# Patient Record
Sex: MALE | Race: BLACK OR AFRICAN AMERICAN | Employment: OTHER | URBAN - METROPOLITAN AREA
[De-identification: names, ages, dates, MRNs, and addresses within clinical notes are randomized per-mention and may not be internally consistent; named-entity substitution may affect disease eponyms.]

---

## 2023-03-07 ENCOUNTER — OFFICE VISIT (OUTPATIENT)
Age: 63
End: 2023-03-07

## 2023-03-07 ENCOUNTER — HOSPITAL ENCOUNTER (OUTPATIENT)
Facility: HOSPITAL | Age: 63
Discharge: HOME | End: 2023-03-10

## 2023-03-07 DIAGNOSIS — M22.2X2 PATELLOFEMORAL DISORDER OF BOTH KNEES: ICD-10-CM

## 2023-03-07 DIAGNOSIS — M22.2X1 PATELLOFEMORAL DISORDER OF BOTH KNEES: ICD-10-CM

## 2023-03-07 DIAGNOSIS — M75.41 IMPINGEMENT SYNDROME OF BOTH SHOULDERS: ICD-10-CM

## 2023-03-07 DIAGNOSIS — M17.0 PRIMARY LOCALIZED OSTEOARTHRITIS OF KNEES, BILATERAL: ICD-10-CM

## 2023-03-07 DIAGNOSIS — M75.42 IMPINGEMENT SYNDROME OF BOTH SHOULDERS: Primary | ICD-10-CM

## 2023-03-07 DIAGNOSIS — M75.41 IMPINGEMENT SYNDROME OF BOTH SHOULDERS: Primary | ICD-10-CM

## 2023-03-07 DIAGNOSIS — M75.42 IMPINGEMENT SYNDROME OF BOTH SHOULDERS: ICD-10-CM

## 2023-03-07 RX ORDER — AMLODIPINE BESYLATE 10 MG/1
TABLET ORAL
COMMUNITY
Start: 2023-02-14

## 2023-03-07 RX ORDER — METHYLPREDNISOLONE ACETATE 40 MG/ML
80 INJECTION, SUSPENSION INTRA-ARTICULAR; INTRALESIONAL; INTRAMUSCULAR; SOFT TISSUE ONCE
Status: COMPLETED | OUTPATIENT
Start: 2023-03-07 | End: 2023-03-07

## 2023-03-07 RX ORDER — PRAVASTATIN SODIUM 20 MG
TABLET ORAL
COMMUNITY
Start: 2023-02-16

## 2023-03-07 RX ORDER — LISINOPRIL AND HYDROCHLOROTHIAZIDE 20; 12.5 MG/1; MG/1
TABLET ORAL
COMMUNITY
Start: 2023-02-14

## 2023-03-07 RX ADMIN — METHYLPREDNISOLONE ACETATE 80 MG: 40 INJECTION, SUSPENSION INTRA-ARTICULAR; INTRALESIONAL; INTRAMUSCULAR; SOFT TISSUE at 16:10

## 2023-03-07 NOTE — PROGRESS NOTES
HISTORY OF PRESENT ILLNESS    David Lewis 1960 is a 58y.o. year old male comes in today as new patient for: pain knees and shoulders    Patients symptoms have been present for 2-3 years. Pain level 9/10 anterior knees and superior shoulders. Knee pain worse stand any length or walk, shoulder worse certain motions. Patient has tried:  ortho eval and cortisone/gel shots knees about 2.5 years ago without any lasting benefit. No Tx shoulders. Never PT and no Rx. Denies any injury. IMAGING: XR shoulders pending    No past surgical history on file. Social History     Socioeconomic History    Marital status: Single      Current Outpatient Medications   Medication Sig Dispense Refill    amLODIPine (NORVASC) 10 MG tablet take 1 tablet by mouth once daily      lisinopril-hydroCHLOROthiazide (PRINZIDE;ZESTORETIC) 20-12.5 MG per tablet take 1 tablet by mouth once daily      pravastatin (PRAVACHOL) 20 MG tablet TAKE 1 TABLET BY MOUTH ONCE A DAY AT BEDTIME FOR 90 DAYS       No current facility-administered medications for this visit. No past medical history on file. No family history on file. ROS:  No numb, tingle. Some swell knees      Objective:  NEURO:  Sensation intact light touch B/L upper/lower extremities. Reflexes +2/4 biceps, triceps, patellar, and Achilles bilaterally. MS:  LE/UE Strength +5/5 bilateral.   bilateral Knee:  1+ Effusion . Lachman negative. Valgus negative. Varus negative. negative joint line tenderness medial.  Magnus negative. Posterior drawer negative. Noble compression test negative. Patellar apprehension negative. Patellar facet positive tender to palpation medial ++ crepitance bilateral.  Pes anserine negative TTP bilateral  SHOULDERS: + brooke and empty can B/L    Assessment/Plan:    Diagnosis Orders   1.  Impingement syndrome of both shoulders  XR SHOULDER LEFT (MIN 2 VIEWS)    XR SHOULDER RIGHT (MIN 2 VIEWS)    diclofenac (VOLTAREN) 50 MG EC tablet    [facility] - [facility]    DRAIN/INJECT LARGE JOINT/BURSA      2. Patellofemoral disorder of both knees  XR SHOULDER LEFT (MIN 2 VIEWS)    XR SHOULDER RIGHT (MIN 2 VIEWS)    diclofenac (VOLTAREN) 50 MG EC tablet    REHABILITATION [facility] - [facility] - [address], [place]      3. Primary localized osteoarthritis of knees, bilateral  XR SHOULDER LEFT (MIN 2 VIEWS)    XR SHOULDER RIGHT (MIN 2 VIEWS)    diclofenac (VOLTAREN) 50 MG EC tablet    [facility] - [facility] - [address]          Patient (or guardian if minor) verbalizes understanding of evaluation and plan. Will start shoulder ROM then RC/PFS HEP, PT, and Rx for voltaren 50mg  as above and plan follow-up 6 weeks. PROCEDURE NOTE:  Time out: 355pm  * Patient was identified by name and date of birth   * Agreement on procedure being performed was verified  * Risks and Benefits explained to the patient  * Procedure site verified and marked as necessary  * Patient was positioned for comfort  * Consent was signed and verified. Risks/benefits including but not limited to bleeding, infection, and scarring discussed and Pt wishes to proceed with procedure. The area was prepped with betadine. Ethyl chloride spray was used, under sterile technique and without ultrasound guidance  1cc of 40mg/cc methylprednisolone acetate and 2cc mepivacaine were injected into left shoulder subacromial bursa. Sterile gauze used to clean the area. Blood loss minimal.  Noticed improvement in pain Sx within 5 minutes (now rated 2/10). The area was prepped with betadine. Ethyl chloride spray was used, under sterile technique and without ultrasound guidance  1cc of 40mg/cc methylprednisolone acetate and 2cc mepivacaine were injected into right shoulder subacromial bursa. Sterile gauze used to clean the area. Blood loss minimal.  Noticed improvement in pain Sx within 5 minutes (now rated 1/10).     Tolerated procedures well.  Discussed possible signs/Sx of infxn, and advised to seek care if concerned. Ultrasound images (if applicable) digitally attached to CPT order in patients chart.

## 2023-03-07 NOTE — LETTER
Name:  Bill Nuñez   :  1960  MR#:  [id number]   [address] / PROCEDURE   1. I (we), ____Kendall Weaver____ authorize Kathy Dailey DO, FAOASM                       (Patient Name)     (Provider / Jesus Alberto Farias)   and/or such assistants as may be selected by him/her, to perform the following operation/procedures   ________inject steroid into both shoulders subacromial_____________________  _____________________________________________________________________  _____________________________________________________________________  Note: If unable to obtain consent prior to an emergent procedure, document the emergent reason in the medical record. This procedure has been explained to my (our) satisfaction and included in the explanation was:   A) the intended benefit, nature, and extent of the procedure to be performed;   B) the significant risks involved and the probability of success;   C) alternative procedures and methods of treatment;   D) the dangers and probable consequences of such alternatives (including no procedure or treatment); E) the expected consequences of the procedure on my future health;   F) whether other qualified individuals would be performing important surgical tasks and / or whether  would be present to advise or support the procedure. I (we) understand that there are other risks of infection and other serious complications in the pre-operative/procedural and postoperative/procedural stages of my (our) care. I (we) have asked all of the questions which I (we) thought were important in deciding whether or not to undergo treatment or diagnosis. These questions have been answered to my (our) satisfaction. I (we) understand that no assurance can be given that the procedure will be a success, and no guarantee or warranty of success has been given to me (us).    2. It has been explained to me (us) that during the course of the operation/procedure, unforeseen conditions may be revealed that necessitate extension of the original procedure(s) or different procedure(s) than those set forth in Paragraph 1. I (we) authorize and request that the above-named physician, his/her assistants or his/her designees, perform procedures as necessary and desirable if deemed to be in my (our) best interest.     3. I acknowledge that other health care personnel may be observing this procedure for the purpose of medical education or other specified purposes as may be necessary as requested and/or approved by my (our) physician. 4. I (we) consent to the disposal by the hospital Pathologist of the removed tissue, parts or organs in accordance with hospital policy. Page 1 of 2        Name:  Bala Figueredo         :  1960         MR#:  [id number]      5. I do__X__ do not____ consent to the use of a local infiltration pain blocking agent that will be used by my provider/surgical provider to help alleviate pain during my procedure. 6. I do___ do not__X__ consent to an emergent blood transfusion in the case of a life-threatening situation that requires blood components to be administered. This consent is valid for 24 hours from the beginning of the procedure. 7. This patient does ___ or does not __X__currently have a DNR status/order. If DNR order is in place, obtain \"Addendum to the Surgical Consent for ALL Patients with a DNR Order\" to address ok-operative status for limited intervention or DNR suspension. 8. I have read and fully understand the above Consent for Operation/Procedure and that all blanks were completed before I signed the consent.    X____________________________________ _____Robert Weaver______   Date: 3/7/2023/_______am/pm   Signature of Patient or legal representative.    ________________________ ______Yael García, ATC____Vani Diaz LPN___  Date: 5416/_______ am/pm   Witness to Signature Printed Name Date / Time    (If patient is unable to sign or is a minor, complete the following)     Patient is a minor, ____years of age, or unable to sign because:   ______________________________________________________________________  Lauri Petties If a phone consent is obtained, consent will be documented by using two health care professionals, each affirming that the consenting party has no questions and gives consent for the procedure discussed with the physician/provider.   _________________________________ _______________________________   Date: ______/_____am/pm   2nd witness to phone consent Printed name Date / Time   Informed consent:   I have provided the explanation described above in section 1 to the patient and/or legal representative. I have provided the patient and/or legal representative with an opportunity to ask any questions about the proposed operation/procedure. _                       _ __LIONEL Borges____ 3/7/2023/_______ am/pm   Provider / Proceduralist Printed Name Date / Time   This Provider / Proceduralist performing the surgery is ONLY for Office-based procedures in [place]   [x] Board certified or Board eligible by one of the [employer] [place], the [employer] [facility] the [employer], the [employer] of Podiatric Medicine, the [employer] of Foot and Ankle Surgery, or other board as approved by the hospital for medical staff appointment.    Revised 8/2/2021 Page 2 of 2

## 2023-03-07 NOTE — PATIENT INSTRUCTIONS
Wall Walk Side                              Wall Walk Front  Standing with the wall on your bad                    Stand facing the wall, place forearm on  side place forearm on the wall. the wall. Walk your arm up the wall as   Walk your arm up the wall as shown. shown in picture. Pull bad shoulder up behind back   with good arm. Hold 5 seconds. Repeat each 15 Times  Perform 2 Time(s) a Day  Warming up with heating pad or doing these in  hot shower makes it much easier.       Search YouTube for my channel:    Dr. Silva Barboza cuff    Runner's Knee

## 2023-03-14 ENCOUNTER — HOSPITAL ENCOUNTER (OUTPATIENT)
Facility: HOSPITAL | Age: 63
Setting detail: RECURRING SERIES
Discharge: HOME | End: 2023-03-17
Payer: COMMERCIAL

## 2023-03-14 PROCEDURE — 97162 PT EVAL MOD COMPLEX 30 MIN: CPT

## 2023-03-14 NOTE — PROGRESS NOTES
[address] [place] [address] PHYSICAL THERAPY  [facility], [place], [address]  Phone: [phone]   [phone]  Plan of Care / Statement of Necessity for Physical Therapy Services     Patient Name: Rosas Gardiner : 1960   Medical   Diagnosis: Impingement syndrome of both shoulders [M75.41, M75.42] Treatment Diagnosis: Pain in right shoulder [M25.511]  Pain in left shoulder [M25.512]   Onset Date: Chronic, 10+ years     Referral Source: Kareem Anton DO Start of Care [facility]): 3/14/2023   Prior Hospitalization: See medical history Provider #: [id number]   Prior Level of Function: RHD; prior to shoulder pain as exercises with free weights 1-2 days/week; yard work   Comorbidities: HTN, high cholesterol   Medications: Verified on Patient Summary List     Assessment / key information: Pt is a 57 y/o M who presents to PT w/ c/o chronic B shoulder pain, that has been present for \"years\" and has progressively worsened. Pt notes pain ranges 3/10 to 9/10, made worse with reaching laterally, overhead, behind back, across body, sidelying; better with medication, avoiding movements that cause pain. Describes pain as sharp, located superior shoulders B. Testing/imaging has included x-rays, per chart review advanced degenerative changes L>R. Prior treatment has included B cortisone injections, noting improvement in pain following. Red flags negative. FOTO 59/100     Clinical Exam Findings:  POSTURE/OBSERVATION: B GHJ IR, scapular protraction, ant tilt, downward rotation. Cervical protrusion. C/s AROM limited in extension to 20 deg otherwise WNL, t/s rot WNL. STRENGTH AROM PROM   Shoulder Left Right Left  (Seated/supine) Right  (Seated/supine) Left Right   Flexion 3+/5 3+/5 114 p!/134 p! 115 p!/144 p!  140 p! 155 p! Extension 3/5 3/5 30/ 30/     Abduction 3+/5 3+/5 104 p!/1 05 p! 102 p!/124 p!  122 p!  135 p! ER (scap plane)  3-/5 p!  3/5 p! 14/10 p! 35/34 p!  20 p!  55 p!    IR (scap plane)  4-/5  4-/5 FIR L glute FIR R glute p! Elbow Left Right Left Right Left Right   Extension 4-/5 4-/5       Flexion 4+/5 4+/5       Scapular Left Right Left Right Left Right   Mid trap 3-/5 3/5       Low trap 2/5 2/5       Serratus Anterior 3+/5 3+/5          PALPATION: poor glenohumeral mobility R inf/post/ant; L posterior>inferior. TTP to L LHB. Poor scapular posterior tilt and upward rotation glides. SPECIAL TEST: (+) neer's, brooke-ewa B. Unable to assess speeds 2' inability to ER beyond ~5 degrees in forward flexion. (-) empty can. Pt presents w/ sx suggesting/consistent with B adhesive capsulitis. Pt could benefit from PT to address impairments and functional limitations in order to improve pt's ability to complete ADLs. Evaluation Complexity:  History:  MEDIUM  Complexity : 1-2 comorbidities / personal factors will impact the outcome/ POC ; Examination:  MEDIUM Complexity : 3 Standardized tests and measures addressin body structure, function, activity limitation and / or participation in recreation  ;Presentation:  MEDIUM Complexity : Evolving with changing characteristics  ; Clinical Decision Making:  MEDIUM Complexity : FOTO score of 26-74 FOTO score = an established functional score where 100 = no disability  Overall Complexity Rating: MEDIUM  Problem List: pain affecting function, decrease ROM, decrease strength, decrease ADL/functional abilities, decrease activity tolerance, and decrease flexibility/joint mobility   Treatment Plan may include any combination of the followin Therapeutic Exercise, 61352 Neuromuscular Re-Education, 62657 Manual Therapy, 27237 Therapeutic Activity, 24915 Self Care/Home Management, and 42803 Electrical Stim unattended  Patient / Family readiness to learn indicated by: asking questions, trying to perform skills, interest, return verbalization , and return demonstration   Persons(s) to be included in education: patient (P)  Barriers to Learning/Limitations: none  Measures taken if barriers to learning present: n/a  Patient Goal (s): \"to feel better, to lift my arms up\"  Patient Self Reported Health Status: good  Rehabilitation Potential: good    Short Term Goals: To be accomplished in 3 weeks  Pt will be ind and compliant to HEP for self management of sx  Improve L shoulder ER PROM to at least 50 deg to improve ease of reaching  Improve B shoulder abd PROM to at least 150 deg to improve ability to reach  Long Term Goals: To be accomplished in 6 weeks  Improve B shoulder flex, abd AROM to at least 130 deg in standing to improve ability to perform overhead ADLs  Improve B FIR AROM to at least L5 to improve ease of self-care  Improve FOTO score to >/= 70/100 to indicate improved function    Frequency / Duration: Patient to be seen 2 times per week for 6 weeks    Patient/ Caregiver education and instruction: Diagnosis, prognosis, self care and exercises [x]  Plan of care has been reviewed with PTA    Certification Period: Wilver Clements, PT       3/14/2023       10:17 AM  ===================================================================  I certify that the above Therapy Services are being furnished while the patient is under my care. I agree with the treatment plan and certify that this therapy is necessary. [de-identified] Signature:_________________________   DATE:_________   TIME:________                           Bronson Lui DO    ** Signature, Date and Time must be completed for valid certification **  Please sign and fax to [facility] Zelaya [phone].   Thank you

## 2023-03-14 NOTE — PROGRESS NOTES
PHYSICAL / OCCUPATIONAL THERAPY - DAILY TREATMENT NOTE (updated )  For Eval visit    Patient Name: Kendall Fung    Date: 3/14/2023    : 1960  Insurance: Payor: PAVAN / Plan: [facility] [place] / Product Type: *No Product type* /      Patient  verified yes     Visit #   Current / Total 1 12   Time   In / Out 10:16 10:50   Pain   In / Out 0 0   Subjective Functional Status/Changes: See POC   Changes to:  Meds, Allergies, Med Hx, Sx Hx?  If yes, update Summary List yes  ; see POC     TREATMENT AREA =  Impingement syndrome of both shoulders [M75.41, M75.42]  Patellofemoral disorder of both knees [M22.2X1, M22.2X2]  Primary localized osteoarthritis of knees, bilateral [M17.0]    OBJECTIVE           27 min   Eval - untimed                      Therapeutic Procedures:  Tx Min Billable or 1:1 Min (if diff from Tx Min) Procedure, Rationale, Specifics   7  09195 Self Care/Home Management (timed):  improve patient knowledge and understanding of activity modification, diagnosis/prognosis, physical therapy expectations, procedures and progression, and HEP  to improve patient's ability to progress to PLOF and address remaining functional goals.  (see flow sheet as applicable)     Details if applicable:            Details if applicable:            Details if applicable:            Details if applicable:     7  [facility] Totals Reminder: bill using total billable min of TIMED therapeutic procedures (example: do not include dry needle or estim unattended, both untimed codes, in totals to left)  8-22 min = 1 unit; 23-37 min = 2 units; 38-52 min = 3 units; 53-67 min = 4 units; 68-82 min = 5 units   Total Total     [x]  Patient Education billed concurrently with other procedures   [x] Review HEP    [] Progressed/Changed HEP, detail:    [] Other detail:       Objective Information/Functional Measures/Assessment    See POC    Patient will continue to benefit from skilled PT / OT services to modify and progress therapeutic  interventions, analyze and address functional mobility deficits, analyze and address ROM deficits, analyze and address strength deficits, analyze and address soft tissue restrictions, analyze and cue for proper movement patterns, analyze and modify for postural abnormalities, and instruct in home and community integration to address functional deficits and attain remaining goals.     Progress toward goals / Updated goals:  [x]  See POC    See POC    PLAN  yes Continue plan of care  []  Upgrade activities as tolerated  []  Discharge due to :  []  Other:    Edward Jimenez, PT    3/14/2023    11:07 AM    Future Appointments   Date Time Provider Aleisha Waldrop   4/18/2023 10:20 AM DO BIENVENIDO Guzman AMB

## 2023-03-28 ENCOUNTER — HOSPITAL ENCOUNTER (OUTPATIENT)
Facility: HOSPITAL | Age: 63
Setting detail: RECURRING SERIES
Discharge: HOME | End: 2023-03-31
Payer: COMMERCIAL

## 2023-03-28 PROCEDURE — 97530 THERAPEUTIC ACTIVITIES: CPT

## 2023-03-28 PROCEDURE — 97110 THERAPEUTIC EXERCISES: CPT

## 2023-03-28 PROCEDURE — 97112 NEUROMUSCULAR REEDUCATION: CPT

## 2023-03-30 ENCOUNTER — HOSPITAL ENCOUNTER (OUTPATIENT)
Facility: HOSPITAL | Age: 63
Setting detail: RECURRING SERIES
End: 2023-03-30
Payer: COMMERCIAL

## 2023-03-30 PROCEDURE — 97530 THERAPEUTIC ACTIVITIES: CPT

## 2023-03-30 PROCEDURE — 97110 THERAPEUTIC EXERCISES: CPT

## 2023-03-30 NOTE — PROGRESS NOTES
PHYSICAL / OCCUPATIONAL THERAPY - DAILY TREATMENT NOTE (updated )    Patient Name: Major Pierce    Date: 3/30/2023    : 1960  Insurance: Payor: Nisa Rawls / Plan: Amanda Cola / Product Type: *No Product type* /      Patient  verified Yes     Visit #   Current / Total 3 12   Time   In / Out 10:00 10:40   Pain   In / Out 3 4   Subjective Functional Status/Changes: Pt c/o ongoing stiffness and pain to b/l shoulders. Changes to:  Meds, Allergies, Med Hx, Sx Hx? If yes, update Summary List no       TREATMENT AREA =  Pain in right shoulder [M25.511]  Pain in left shoulder [M25.512]    OBJECTIVE         Therapeutic Procedures: Tx Min Billable or 1:1 Min (if diff from Tx Min) Procedure, Rationale, Specifics    78673 Therapeutic Exercise (timed):  increase ROM, strength, coordination, balance, and proprioception to improve patient's ability to progress to PLOF and address remaining functional goals. (see flow sheet as applicable)     Details if applicable:       10 10 75169 Therapeutic Activity (timed):  use of dynamic activities replicating functional movements to increase ROM, strength, coordination, balance, and proprioception in order to improve patient's ability to progress to PLOF and address remaining functional goals. (see flow sheet as applicable)     Details if applicable:       92081 Neuromuscular Re-Education (timed):  improve balance, coordination, kinesthetic sense, posture, core stability and proprioception to improve patient's ability to develop conscious control of individual muscles and awareness of position of extremities in order to progress to PLOF and address remaining functional goals.  (see flow sheet as applicable)     Details if applicable:     7 7 11286 Manual Therapy (timed):  decrease pain, increase ROM, increase tissue extensibility, decrease trigger points, and increase postural awareness to improve patient's ability to progress to PLOF and address remaining functional goals.  The manual therapy interventions were performed at a separate and distinct time from the therapeutic activities interventions . (see flow sheet as applicable)     Details if applicable:  Bilateral supine grade 2-3 posterior/inferior GH mobs and GH PROM ER (with contract-relax) and flex/scaption          Details if applicable:     40 27 [facility] Totals Reminder: bill using total billable min of TIMED therapeutic procedures (example: do not include dry needle or estim unattended, both untimed codes, in totals to left)  8-22 min = 1 unit; 23-37 min = 2 units; 38-52 min = 3 units; 53-67 min = 4 units; 68-82 min = 5 units   Total Total     [x]  Patient Education billed concurrently with other procedures   [x] Review HEP    [] Progressed/Changed HEP, detail:    [x] Other detail:   discussed benefit for obtaining pulleys for HEP; pt states he may figure out something at home    Objective Information/Functional Measures/Assessment  Pt with significant ROM limitations L>R shoulder; encouraged on daily compliance with HEP with emphasis on gentle stretching. Patient will continue to benefit from skilled PT / OT services to modify and progress therapeutic interventions, analyze and address functional mobility deficits, analyze and address ROM deficits, analyze and address strength deficits, analyze and address soft tissue restrictions, analyze and cue for proper movement patterns, analyze and modify for postural abnormalities, and instruct in home and community integration to address functional deficits and attain remaining goals. Progress toward goals / Updated goals:  []  See Progress Note/Recertification  Short Term Goals:  To be accomplished in 3 weeks  Pt will be ind and compliant to HEP for self management of sx 3/28/23: Not Met, progressing, pt reports inconsistent compliance with current HEP due to the loss of his mother since last treatment, increased compliance was encouraged once he has decreased grieving

## 2023-04-04 ENCOUNTER — HOSPITAL ENCOUNTER (OUTPATIENT)
Facility: HOSPITAL | Age: 63
Setting detail: RECURRING SERIES
Discharge: HOME | End: 2023-04-07
Payer: COMMERCIAL

## 2023-04-04 PROCEDURE — 97530 THERAPEUTIC ACTIVITIES: CPT

## 2023-04-04 PROCEDURE — 97110 THERAPEUTIC EXERCISES: CPT

## 2023-04-04 NOTE — PROGRESS NOTES
instruct in home and community integration to address functional deficits and attain remaining goals. Progress toward goals / Updated goals:  []  See Progress Note/Recertification  Short Term Goals: To be accomplished in 3 weeks  Pt will be ind and compliant to HEP for self management of sx 3/28/23: Not Met, progressing, pt reports inconsistent compliance with current HEP due to the loss of his mother since last treatment, increased compliance was encouraged once he has decreased grieving process   Improve L shoulder ER PROM to at least 50 deg to improve ease of reaching  Status at last note/certification: 20 deg p! Current:     Improve B shoulder abd PROM to at least 150 deg to improve ability to reach  Status at last note/certification: L 603 p!, R 135 p! Current:        Long Term Goals: To be accomplished in 6 weeks  Improve B shoulder flex, abd AROM to at least 130 deg in standing to improve ability to perform overhead ADLs  Status at last note/certification: Flex L 114 p!, R 115 p! Lucyann Push Abd L 104 p!, R 102 p!   Current:     Improve B FIR AROM to at least L5 to improve ease of self-care  Status at last note/certification: b/l to glute (painful on R)  Current:     Improve FOTO score to >/= 70/100 to indicate improved function  Status at last note/certification: 59  Current:       PLAN  Yes  Continue plan of care  [x]  Upgrade activities as tolerated  []  Discharge due to :  []  Other:    Daniel Mitchell, PTA    4/4/2023    10:02 AM    Future Appointments   Date Time Provider Aleisha Waldrop   4/6/2023 10:00 AM Renetta Soriano PT [facility]   4/11/2023 10:00 AM Daniel Mitchell, PTA [facility]   4/13/2023 10:00 AM Daniel Stephen, PTA [facility]   4/19/2023 10:00 AM DO BIENVENIDO Gold AMB

## 2023-04-13 ENCOUNTER — HOSPITAL ENCOUNTER (OUTPATIENT)
Facility: HOSPITAL | Age: 63
Setting detail: RECURRING SERIES
Discharge: HOME | End: 2023-04-16
Payer: COMMERCIAL

## 2023-04-13 PROCEDURE — 97110 THERAPEUTIC EXERCISES: CPT

## 2023-04-13 PROCEDURE — 97530 THERAPEUTIC ACTIVITIES: CPT

## 2023-04-19 ENCOUNTER — OFFICE VISIT (OUTPATIENT)
Age: 63
End: 2023-04-19

## 2023-04-19 VITALS — BODY MASS INDEX: 35.55 KG/M2 | RESPIRATION RATE: 14 BRPM | WEIGHT: 240 LBS | HEIGHT: 69 IN

## 2023-04-19 DIAGNOSIS — M75.41 IMPINGEMENT SYNDROME OF BOTH SHOULDERS: Primary | ICD-10-CM

## 2023-04-19 DIAGNOSIS — M22.2X2 PATELLOFEMORAL DISORDER OF BOTH KNEES: ICD-10-CM

## 2023-04-19 DIAGNOSIS — M17.0 PRIMARY LOCALIZED OSTEOARTHRITIS OF KNEES, BILATERAL: ICD-10-CM

## 2023-04-19 DIAGNOSIS — M22.2X1 PATELLOFEMORAL DISORDER OF BOTH KNEES: ICD-10-CM

## 2023-04-19 DIAGNOSIS — M75.42 IMPINGEMENT SYNDROME OF BOTH SHOULDERS: Primary | ICD-10-CM

## 2023-04-19 NOTE — PROGRESS NOTES
facet mild tender to palpation medial ++ crepitance bilateral.  Pes anserine negative TTP bilateral  SHOULDERS: mild brooke and empty can B/L    Assessment/Plan:    Diagnosis Orders   1. Impingement syndrome of both shoulders  diclofenac (VOLTAREN) 50 MG EC tablet      2. Patellofemoral disorder of both knees  diclofenac (VOLTAREN) 50 MG EC tablet      3. Primary localized osteoarthritis of knees, bilateral  diclofenac (VOLTAREN) 50 MG EC tablet          Patient (or guardian if minor) verbalizes understanding of evaluation and plan. Will continue HEP, PT, and Rx for voltaren 50mg  as above and plan follow-up 5-6 weeks.

## 2023-08-17 DIAGNOSIS — M17.0 PRIMARY LOCALIZED OSTEOARTHRITIS OF KNEES, BILATERAL: ICD-10-CM

## 2023-08-17 DIAGNOSIS — M22.2X1 PATELLOFEMORAL DISORDER OF BOTH KNEES: ICD-10-CM

## 2023-08-17 DIAGNOSIS — M75.41 IMPINGEMENT SYNDROME OF BOTH SHOULDERS: ICD-10-CM

## 2023-08-17 DIAGNOSIS — M22.2X2 PATELLOFEMORAL DISORDER OF BOTH KNEES: ICD-10-CM

## 2023-08-17 DIAGNOSIS — M75.42 IMPINGEMENT SYNDROME OF BOTH SHOULDERS: ICD-10-CM

## 2023-10-19 ENCOUNTER — HOSPITAL ENCOUNTER (OUTPATIENT)
Facility: HOSPITAL | Age: 63
Discharge: HOME | End: 2023-10-22

## 2023-10-19 ENCOUNTER — OFFICE VISIT (OUTPATIENT)
Age: 63
End: 2023-10-19

## 2023-10-19 VITALS — WEIGHT: 242 LBS | BODY MASS INDEX: 35.74 KG/M2

## 2023-10-19 DIAGNOSIS — M22.2X2 PATELLOFEMORAL DISORDER OF BOTH KNEES: ICD-10-CM

## 2023-10-19 DIAGNOSIS — M22.2X1 PATELLOFEMORAL DISORDER OF BOTH KNEES: ICD-10-CM

## 2023-10-19 DIAGNOSIS — M17.0 PRIMARY LOCALIZED OSTEOARTHRITIS OF KNEES, BILATERAL: ICD-10-CM

## 2023-10-19 DIAGNOSIS — M17.0 PRIMARY LOCALIZED OSTEOARTHRITIS OF KNEES, BILATERAL: Primary | ICD-10-CM

## 2023-10-19 DIAGNOSIS — M75.42 IMPINGEMENT SYNDROME OF BOTH SHOULDERS: ICD-10-CM

## 2023-10-19 DIAGNOSIS — M75.41 IMPINGEMENT SYNDROME OF BOTH SHOULDERS: ICD-10-CM

## 2023-10-19 RX ORDER — METHYLPREDNISOLONE ACETATE 40 MG/ML
40 INJECTION, SUSPENSION INTRA-ARTICULAR; INTRALESIONAL; INTRAMUSCULAR; SOFT TISSUE ONCE
Status: COMPLETED | OUTPATIENT
Start: 2023-10-19 | End: 2023-10-19

## 2023-10-19 RX ADMIN — METHYLPREDNISOLONE ACETATE 40 MG: 40 INJECTION, SUSPENSION INTRA-ARTICULAR; INTRALESIONAL; INTRAMUSCULAR; SOFT TISSUE at 16:36

## 2023-10-19 NOTE — PATIENT INSTRUCTIONS
If you had a joint injection done today by Dr. Bernarda Butcher, you should notice pain is much better for 3-4 hours, then will likely return to prior levels until 2-3 days from now when the cortisone kicks in. Any issues with redness, increased pain or heat in the joint or a fever please reach out to our office at [phone].        Search YouTube for my channel:    Dr. Stevenson Cancer back/Piriformis    Runner's Knee

## 2023-10-19 NOTE — PROGRESS NOTES
methylprednisolone acetate and 4cc mepivacaine were injected into right knee intraarticular using lateral approach with 21 gauge needle after confirming synovial fluid aspirated. Sterile gauze used to clean the area. Sterile bandage applied. Blood loss minimal.  Noticed improvement in pain Sx within 5 minutes (now rated 0/10). Tolerated procedure well. Discussed possible signs/Sx of infxn, and advised to seek care if concerned. Ultrasound images (if applicable) digitally attached to CPT order in patients chart.

## 2024-01-31 ENCOUNTER — OFFICE VISIT (OUTPATIENT)
Age: 64
End: 2024-01-31
Payer: MEDICAID

## 2024-01-31 VITALS — HEIGHT: 69 IN | BODY MASS INDEX: 36.73 KG/M2 | WEIGHT: 248 LBS | RESPIRATION RATE: 14 BRPM

## 2024-01-31 DIAGNOSIS — M75.41 IMPINGEMENT SYNDROME OF BOTH SHOULDERS: Primary | ICD-10-CM

## 2024-01-31 DIAGNOSIS — M75.42 IMPINGEMENT SYNDROME OF BOTH SHOULDERS: Primary | ICD-10-CM

## 2024-01-31 DIAGNOSIS — M22.2X1 PATELLOFEMORAL DISORDER OF BOTH KNEES: ICD-10-CM

## 2024-01-31 DIAGNOSIS — M17.0 PRIMARY LOCALIZED OSTEOARTHRITIS OF KNEES, BILATERAL: ICD-10-CM

## 2024-01-31 DIAGNOSIS — M22.2X2 PATELLOFEMORAL DISORDER OF BOTH KNEES: ICD-10-CM

## 2024-01-31 PROCEDURE — 99214 OFFICE O/P EST MOD 30 MIN: CPT | Performed by: FAMILY MEDICINE

## 2024-01-31 NOTE — PROGRESS NOTES
HISTORY OF PRESENT ILLNESS    Kendall Fung 1960 is a 63 y.o. year old male comes in today to be evaluated and treated for: pain knees, shoulders bilateral  - chronic    Since last appt 10/19/2023 has noticed pain years but worse lately. Pain level 6/10. Using Voltaren 50mg with benefit but ran out. Denies side effects.    IMAGING: XR both shoulder 3/7/2023  Advanced degenerative changes in the shoulders bilaterally, left greater than   right.      History reviewed. No pertinent surgical history.  Social History     Socioeconomic History    Marital status: Single     Spouse name: None    Number of children: None    Years of education: None    Highest education level: None   Tobacco Use    Smoking status: Never    Smokeless tobacco: Never   Vaping Use    Vaping Use: Never used   Substance and Sexual Activity    Alcohol use: Yes     Current Outpatient Medications   Medication Sig Dispense Refill    diclofenac (VOLTAREN) 50 MG EC tablet Take 1 tablet by mouth with breakfast and with evening meal As needed pain. 270 tablet 0    amLODIPine (NORVASC) 10 MG tablet take 1 tablet by mouth once daily      lisinopril-hydroCHLOROthiazide (PRINZIDE;ZESTORETIC) 20-12.5 MG per tablet take 1 tablet by mouth once daily      pravastatin (PRAVACHOL) 20 MG tablet TAKE 1 TABLET BY MOUTH ONCE A DAY AT BEDTIME FOR 90 DAYS       No current facility-administered medications for this visit.     Past Medical History:   Diagnosis Date    ED (erectile dysfunction)     Elevated PSA     HTN (hypertension)      History reviewed. No pertinent family history.    ROS:  No swell, numb, incont    Objective:  Resp 14   Ht 1.753 m (5' 9\")   Wt 112.5 kg (248 lb)   BMI 36.62 kg/m²   NEURO:  Sensation intact light touch B/L upper/lower extremities. Reflexes +2/4 biceps, triceps, patellar, and Achilles bilaterally.  MS:  LE/UE Strength +5/5 bilateral.   bilateral Knee:  1+ Effusion .  Lachman negative.  Valgus negative.  Varus negative.  negative

## 2024-05-16 ENCOUNTER — TELEPHONE (OUTPATIENT)
Age: 64
End: 2024-05-16

## 2024-05-16 NOTE — TELEPHONE ENCOUNTER
Attempted to contact pt at his preferred number.  Unable to leave message and was out of service.   Please schedule an appointment with Dr Patterson at next available if patient returns call.

## 2024-05-23 ENCOUNTER — OFFICE VISIT (OUTPATIENT)
Age: 64
End: 2024-05-23
Payer: MEDICARE

## 2024-05-23 VITALS — BODY MASS INDEX: 34.7 KG/M2 | WEIGHT: 235 LBS

## 2024-05-23 DIAGNOSIS — M99.05 PELVIC REGION SOMATIC DYSFUNCTION: ICD-10-CM

## 2024-05-23 DIAGNOSIS — M75.41 IMPINGEMENT SYNDROME OF BOTH SHOULDERS: Primary | ICD-10-CM

## 2024-05-23 DIAGNOSIS — M99.04 SACRAL REGION SOMATIC DYSFUNCTION: ICD-10-CM

## 2024-05-23 DIAGNOSIS — M99.06 LOWER LIMB REGION SOMATIC DYSFUNCTION: ICD-10-CM

## 2024-05-23 DIAGNOSIS — M99.09 SOMATIC DYSFUNCTION OF ABDOMINAL REGION: ICD-10-CM

## 2024-05-23 DIAGNOSIS — M99.07 UPPER EXTREMITY SOMATIC DYSFUNCTION: ICD-10-CM

## 2024-05-23 DIAGNOSIS — M99.03 SOMATIC DYSFUNCTION OF LUMBAR REGION: ICD-10-CM

## 2024-05-23 DIAGNOSIS — M22.2X1 PATELLOFEMORAL DISORDER OF BOTH KNEES: ICD-10-CM

## 2024-05-23 DIAGNOSIS — M75.42 IMPINGEMENT SYNDROME OF BOTH SHOULDERS: Primary | ICD-10-CM

## 2024-05-23 DIAGNOSIS — M99.02 THORACIC REGION SOMATIC DYSFUNCTION: ICD-10-CM

## 2024-05-23 DIAGNOSIS — M17.0 PRIMARY LOCALIZED OSTEOARTHRITIS OF KNEES, BILATERAL: ICD-10-CM

## 2024-05-23 DIAGNOSIS — M22.2X2 PATELLOFEMORAL DISORDER OF BOTH KNEES: ICD-10-CM

## 2024-05-23 DIAGNOSIS — M99.08 RIB CAGE REGION SOMATIC DYSFUNCTION: ICD-10-CM

## 2024-05-23 DIAGNOSIS — M99.01 CERVICAL SOMATIC DYSFUNCTION: ICD-10-CM

## 2024-05-23 PROCEDURE — 3017F COLORECTAL CA SCREEN DOC REV: CPT | Performed by: FAMILY MEDICINE

## 2024-05-23 PROCEDURE — G8417 CALC BMI ABV UP PARAM F/U: HCPCS | Performed by: FAMILY MEDICINE

## 2024-05-23 PROCEDURE — 98929 OSTEOPATH MANJ 9-10 REGIONS: CPT | Performed by: FAMILY MEDICINE

## 2024-05-23 PROCEDURE — G8427 DOCREV CUR MEDS BY ELIG CLIN: HCPCS | Performed by: FAMILY MEDICINE

## 2024-05-23 PROCEDURE — 1036F TOBACCO NON-USER: CPT | Performed by: FAMILY MEDICINE

## 2024-05-23 PROCEDURE — 99214 OFFICE O/P EST MOD 30 MIN: CPT | Performed by: FAMILY MEDICINE

## 2024-05-23 NOTE — PROGRESS NOTES
HISTORY OF PRESENT ILLNESS    Kendall Fung 1960 is a 63 y.o. year old male comes in today to be evaluated and treated for: pain knees, shoulders bilateral - chronic    Since last appt 1/31/2024 has noticed pain doing well until ran out Voltaren 50mg a week or so ago and flared up. Pain level 5/10. Using Voltaren 50mg with benefit prior. Bonita side effects.    IMAGING: XR both shoulder 3/7/2023  Advanced degenerative changes in the shoulders bilaterally, left greater than   right.        No past surgical history on file.  Social History     Socioeconomic History    Marital status: Single     Spouse name: None    Number of children: None    Years of education: None    Highest education level: None   Tobacco Use    Smoking status: Never    Smokeless tobacco: Never   Vaping Use    Vaping Use: Never used   Substance and Sexual Activity    Alcohol use: Yes     Current Outpatient Medications   Medication Sig Dispense Refill    diclofenac (VOLTAREN) 50 MG EC tablet Take 1 tablet by mouth with breakfast and with evening meal As needed pain. 270 tablet 0    amLODIPine (NORVASC) 10 MG tablet take 1 tablet by mouth once daily      lisinopril-hydroCHLOROthiazide (PRINZIDE;ZESTORETIC) 20-12.5 MG per tablet take 1 tablet by mouth once daily      pravastatin (PRAVACHOL) 20 MG tablet TAKE 1 TABLET BY MOUTH ONCE A DAY AT BEDTIME FOR 90 DAYS       No current facility-administered medications for this visit.     Past Medical History:   Diagnosis Date    ED (erectile dysfunction)     Elevated PSA     HTN (hypertension)      No family history on file.      ROS:  No numb, swell    Objective:  Wt 106.6 kg (235 lb)   BMI 34.70 kg/m²   NEURO:  Sensation intact light touch B/L upper/lower extremities. Reflexes +2/4 biceps, triceps, patellar, and Achilles bilaterally.  MS:  LE/UE Strength +5/5 bilateral.   bilateral Knee:  1+ Effusion .  Lachman negative.  Valgus negative.  Varus negative.  negative joint line tenderness medial.

## 2024-09-16 ENCOUNTER — OFFICE VISIT (OUTPATIENT)
Age: 64
End: 2024-09-16
Payer: MEDICARE

## 2024-09-16 VITALS — BODY MASS INDEX: 35.4 KG/M2 | WEIGHT: 239 LBS | HEIGHT: 69 IN | RESPIRATION RATE: 14 BRPM

## 2024-09-16 DIAGNOSIS — M75.41 IMPINGEMENT SYNDROME OF BOTH SHOULDERS: Primary | ICD-10-CM

## 2024-09-16 DIAGNOSIS — M99.06 LOWER LIMB REGION SOMATIC DYSFUNCTION: ICD-10-CM

## 2024-09-16 DIAGNOSIS — M22.2X1 PATELLOFEMORAL DISORDER OF BOTH KNEES: ICD-10-CM

## 2024-09-16 DIAGNOSIS — M99.05 PELVIC REGION SOMATIC DYSFUNCTION: ICD-10-CM

## 2024-09-16 DIAGNOSIS — M99.01 CERVICAL SOMATIC DYSFUNCTION: ICD-10-CM

## 2024-09-16 DIAGNOSIS — M99.07 UPPER EXTREMITY SOMATIC DYSFUNCTION: ICD-10-CM

## 2024-09-16 DIAGNOSIS — M22.2X2 PATELLOFEMORAL DISORDER OF BOTH KNEES: ICD-10-CM

## 2024-09-16 DIAGNOSIS — M99.03 SOMATIC DYSFUNCTION OF LUMBAR REGION: ICD-10-CM

## 2024-09-16 DIAGNOSIS — M17.0 PRIMARY LOCALIZED OSTEOARTHRITIS OF KNEES, BILATERAL: ICD-10-CM

## 2024-09-16 DIAGNOSIS — M99.09 SOMATIC DYSFUNCTION OF ABDOMINAL REGION: ICD-10-CM

## 2024-09-16 DIAGNOSIS — M99.04 SACRAL REGION SOMATIC DYSFUNCTION: ICD-10-CM

## 2024-09-16 DIAGNOSIS — M75.42 IMPINGEMENT SYNDROME OF BOTH SHOULDERS: Primary | ICD-10-CM

## 2024-09-16 DIAGNOSIS — M99.08 RIB CAGE REGION SOMATIC DYSFUNCTION: ICD-10-CM

## 2024-09-16 DIAGNOSIS — M99.02 THORACIC REGION SOMATIC DYSFUNCTION: ICD-10-CM

## 2024-09-16 PROCEDURE — G8427 DOCREV CUR MEDS BY ELIG CLIN: HCPCS | Performed by: FAMILY MEDICINE

## 2024-09-16 PROCEDURE — 1036F TOBACCO NON-USER: CPT | Performed by: FAMILY MEDICINE

## 2024-09-16 PROCEDURE — 3017F COLORECTAL CA SCREEN DOC REV: CPT | Performed by: FAMILY MEDICINE

## 2024-09-16 PROCEDURE — 98929 OSTEOPATH MANJ 9-10 REGIONS: CPT | Performed by: FAMILY MEDICINE

## 2024-09-16 PROCEDURE — G8417 CALC BMI ABV UP PARAM F/U: HCPCS | Performed by: FAMILY MEDICINE

## 2024-09-16 PROCEDURE — 99214 OFFICE O/P EST MOD 30 MIN: CPT | Performed by: FAMILY MEDICINE

## 2024-12-08 DIAGNOSIS — M75.41 IMPINGEMENT SYNDROME OF BOTH SHOULDERS: ICD-10-CM

## 2024-12-08 DIAGNOSIS — M22.2X1 PATELLOFEMORAL DISORDER OF BOTH KNEES: ICD-10-CM

## 2024-12-08 DIAGNOSIS — M17.0 PRIMARY LOCALIZED OSTEOARTHRITIS OF KNEES, BILATERAL: ICD-10-CM

## 2024-12-08 DIAGNOSIS — M22.2X2 PATELLOFEMORAL DISORDER OF BOTH KNEES: ICD-10-CM

## 2024-12-08 DIAGNOSIS — M75.42 IMPINGEMENT SYNDROME OF BOTH SHOULDERS: ICD-10-CM

## 2024-12-16 ENCOUNTER — HOSPITAL ENCOUNTER (OUTPATIENT)
Facility: HOSPITAL | Age: 64
Discharge: HOME | End: 2024-12-19

## 2024-12-16 ENCOUNTER — OFFICE VISIT (OUTPATIENT)
Age: 64
End: 2024-12-16
Payer: COMMERCIAL

## 2024-12-16 VITALS — WEIGHT: 247 LBS | BODY MASS INDEX: 36.48 KG/M2

## 2024-12-16 DIAGNOSIS — M99.08 RIB CAGE REGION SOMATIC DYSFUNCTION: ICD-10-CM

## 2024-12-16 DIAGNOSIS — M51.360 DEGENERATION OF INTERVERTEBRAL DISC OF LUMBAR REGION WITH DISCOGENIC BACK PAIN: ICD-10-CM

## 2024-12-16 DIAGNOSIS — M99.03 SOMATIC DYSFUNCTION OF LUMBAR REGION: ICD-10-CM

## 2024-12-16 DIAGNOSIS — M99.01 CERVICAL SOMATIC DYSFUNCTION: ICD-10-CM

## 2024-12-16 DIAGNOSIS — M51.360 DEGENERATION OF INTERVERTEBRAL DISC OF LUMBAR REGION WITH DISCOGENIC BACK PAIN: Primary | ICD-10-CM

## 2024-12-16 DIAGNOSIS — M99.09 SOMATIC DYSFUNCTION OF ABDOMINAL REGION: ICD-10-CM

## 2024-12-16 DIAGNOSIS — M17.0 PRIMARY LOCALIZED OSTEOARTHRITIS OF KNEES, BILATERAL: ICD-10-CM

## 2024-12-16 DIAGNOSIS — M75.41 IMPINGEMENT SYNDROME OF BOTH SHOULDERS: ICD-10-CM

## 2024-12-16 DIAGNOSIS — M99.07 UPPER EXTREMITY SOMATIC DYSFUNCTION: ICD-10-CM

## 2024-12-16 DIAGNOSIS — M75.42 IMPINGEMENT SYNDROME OF BOTH SHOULDERS: ICD-10-CM

## 2024-12-16 DIAGNOSIS — M22.2X2 PATELLOFEMORAL DISORDER OF BOTH KNEES: ICD-10-CM

## 2024-12-16 DIAGNOSIS — M99.02 THORACIC REGION SOMATIC DYSFUNCTION: ICD-10-CM

## 2024-12-16 DIAGNOSIS — M99.05 PELVIC REGION SOMATIC DYSFUNCTION: ICD-10-CM

## 2024-12-16 DIAGNOSIS — M22.2X1 PATELLOFEMORAL DISORDER OF BOTH KNEES: ICD-10-CM

## 2024-12-16 DIAGNOSIS — M99.06 LOWER LIMB REGION SOMATIC DYSFUNCTION: ICD-10-CM

## 2024-12-16 DIAGNOSIS — M99.04 SACRAL REGION SOMATIC DYSFUNCTION: ICD-10-CM

## 2024-12-16 PROCEDURE — 99214 OFFICE O/P EST MOD 30 MIN: CPT | Performed by: FAMILY MEDICINE

## 2024-12-16 PROCEDURE — 98929 OSTEOPATH MANJ 9-10 REGIONS: CPT | Performed by: FAMILY MEDICINE

## 2024-12-16 PROCEDURE — G8484 FLU IMMUNIZE NO ADMIN: HCPCS | Performed by: FAMILY MEDICINE

## 2024-12-16 PROCEDURE — 1036F TOBACCO NON-USER: CPT | Performed by: FAMILY MEDICINE

## 2024-12-16 PROCEDURE — 3017F COLORECTAL CA SCREEN DOC REV: CPT | Performed by: FAMILY MEDICINE

## 2024-12-16 PROCEDURE — G8427 DOCREV CUR MEDS BY ELIG CLIN: HCPCS | Performed by: FAMILY MEDICINE

## 2024-12-16 PROCEDURE — G8417 CALC BMI ABV UP PARAM F/U: HCPCS | Performed by: FAMILY MEDICINE

## 2024-12-16 NOTE — PROGRESS NOTES
HISTORY OF PRESENT ILLNESS    Kendall Fung 1960 is a 64 y.o. year old male comes in today to be evaluated and treated for: pain shoulders, knees - chronic    Since last appt 9/16/2024 has noticed Sx worsening with colder weather. Pain level 8/10. Using Voltaren 50mg with benefit. Also has issues with back giving out from time to time.    IMAGING: XR both knees 10/19/2023  IMPRESSION:  Severe bilateral knee joint osteoarthrosis most pronounced in the medial  compartments with varus deformity. Moderate bilateral joint effusions.    XR both shoulders 3/7/2023  Advanced degenerative changes in the shoulders bilaterally, left greater than   right.        Past Surgical History:   Procedure Laterality Date    UROLOGICAL SURGERY  07/25/2024    PNBx [place] 6, PSA 4.9,Dr. Cid, [facility]     Social History     Socioeconomic History    Marital status: Single     Spouse name: None    Number of children: None    Years of education: None    Highest education level: None   Tobacco Use    Smoking status: Never    Smokeless tobacco: Never   Vaping Use    Vaping status: Never Used   Substance and Sexual Activity    Alcohol use: Yes     Current Outpatient Medications   Medication Sig Dispense Refill    tadalafil (CIALIS) 5 MG tablet Take 1 tablet by mouth daily 90 tablet 3    lisinopril-hydroCHLOROthiazide (PRINZIDE;ZESTORETIC) 20-25 MG per tablet Take 1 tablet by mouth daily      tadalafil (CIALIS) 20 MG tablet Take 1 tablet by mouth daily as needed for Erectile Dysfunction 30 tablet 3    diclofenac (VOLTAREN) 50 MG EC tablet Take 1 tablet by mouth with breakfast and with evening meal As needed pain. 180 tablet 0    amLODIPine (NORVASC) 10 MG tablet take 1 tablet by mouth once daily      pravastatin (PRAVACHOL) 20 MG tablet TAKE 1 TABLET BY MOUTH ONCE A DAY AT BEDTIME FOR 90 DAYS       No current facility-administered medications for this visit.     Past Medical History:   Diagnosis Date    ED (erectile dysfunction)     Elevated PSA

## 2025-03-11 RX ORDER — LISINOPRIL 40 MG/1
40 TABLET ORAL DAILY
COMMUNITY

## 2025-03-11 RX ORDER — HYDROCHLOROTHIAZIDE 25 MG/1
25 TABLET ORAL DAILY
COMMUNITY

## 2025-03-11 NOTE — PROGRESS NOTES
Instructions for your procedure at [facility]      Today's Date: 3/11/2025      Patient's Name: Kendall Fung      Procedure Date: March 18, 2025         Please enter the main entrance of the hospital and check-in at the  located in the lobby.      Do NOT eat or drink anything, including candy, gum, or ice chips after midnight prior to your procedure, unless it is part of your prep.  Brush your teeth before coming to the hospital.You may swish with water, but do not swallow.  No smoking/Vaping/E-Cigarettes 24 hours prior to the day of procedure.  No alcohol 24 hours prior to the day of procedure.  No recreational drugs for one week prior to the day of procedure.  Bring Photo ID, Insurance information, and Co-pay if required on day of procedure.  Bring in pertinent legal documents, such as, Medical Power of , DNR, Advance Directive, etc.  Leave all other valuables, including money/purse, weapons at home.  Remove jewelry, including ALL body piercings, nail polish, acrylic nails, and makeup (including mascara); no lotions, powders, deodorant, and/or perfume/cologne/after shave on the skin.  Glasses and dentures may be worn to the hospital.  They must be removed prior to procedure. Please bring case/container for glasses or dentures.  11. Contacts should not be worn on day of procedure.   12. Call the office ([phone]) if you have symptoms of a cold or illness within 24-48 hours prior to your procedure.   13. AN ADULT (relative or friend 18 years or older) MUST DRIVE YOU HOME AFTER YOUR PROCEDURE.   14. Please make arrangements for a responsible adult (18 years or older) to be with you for 24 hours after your procedure.   15. TWO VISITORS will be allowed in the waiting area during your procedure.       Special Instructions:      Bring list of CURRENT medications.  Follow instructions from the office regarding Bowel Prep, Vitamins, Iron, Blood Thinners, Insulin, Seizure,

## 2025-03-17 ENCOUNTER — ANESTHESIA EVENT (OUTPATIENT)
Facility: HOSPITAL | Age: 65
End: 2025-03-17
Payer: MEDICARE

## 2025-03-18 ENCOUNTER — ANESTHESIA (OUTPATIENT)
Facility: HOSPITAL | Age: 65
End: 2025-03-18
Payer: MEDICARE

## 2025-03-18 ENCOUNTER — HOSPITAL ENCOUNTER (OUTPATIENT)
Facility: HOSPITAL | Age: 65
Setting detail: OUTPATIENT SURGERY
Discharge: HOME | End: 2025-03-18
Attending: INTERNAL MEDICINE | Admitting: INTERNAL MEDICINE
Payer: MEDICARE

## 2025-03-18 VITALS
DIASTOLIC BLOOD PRESSURE: 76 MMHG | SYSTOLIC BLOOD PRESSURE: 165 MMHG | TEMPERATURE: 98.4 F | HEART RATE: 73 BPM | WEIGHT: 249 LBS | HEIGHT: 69 IN | RESPIRATION RATE: 16 BRPM | BODY MASS INDEX: 36.88 KG/M2 | OXYGEN SATURATION: 98 %

## 2025-03-18 LAB
ANION GAP SERPL CALC-SCNC: 6 MMOL/L (ref 3–18)
BUN SERPL-MCNC: 12 MG/DL (ref 7–18)
BUN/CREAT SERPL: 10 (ref 12–20)
CALCIUM SERPL-MCNC: 9.1 MG/DL (ref 8.5–10.1)
CHLORIDE SERPL-SCNC: 103 MMOL/L (ref 100–111)
CO2 SERPL-SCNC: 26 MMOL/L (ref 21–32)
CREAT SERPL-MCNC: 1.16 MG/DL (ref 0.6–1.3)
GLUCOSE SERPL-MCNC: 120 MG/DL (ref 74–99)
POTASSIUM SERPL-SCNC: 4.3 MMOL/L (ref 3.5–5.5)
SODIUM SERPL-SCNC: 135 MMOL/L (ref 136–145)

## 2025-03-18 PROCEDURE — 6360000002 HC RX W HCPCS: Performed by: ANESTHESIOLOGY

## 2025-03-18 PROCEDURE — 3700000000 HC ANESTHESIA ATTENDED CARE: Performed by: INTERNAL MEDICINE

## 2025-03-18 PROCEDURE — 88342 IMHCHEM/IMCYTCHM 1ST ANTB: CPT

## 2025-03-18 PROCEDURE — 3600007502: Performed by: INTERNAL MEDICINE

## 2025-03-18 PROCEDURE — 2709999900 HC NON-CHARGEABLE SUPPLY: Performed by: INTERNAL MEDICINE

## 2025-03-18 PROCEDURE — 2580000003 HC RX 258: Performed by: NURSE ANESTHETIST, CERTIFIED REGISTERED

## 2025-03-18 PROCEDURE — 80048 BASIC METABOLIC PNL TOTAL CA: CPT

## 2025-03-18 PROCEDURE — 7100000000 HC PACU RECOVERY - FIRST 15 MIN: Performed by: INTERNAL MEDICINE

## 2025-03-18 PROCEDURE — 7100000010 HC PHASE II RECOVERY - FIRST 15 MIN: Performed by: INTERNAL MEDICINE

## 2025-03-18 PROCEDURE — 88305 TISSUE EXAM BY PATHOLOGIST: CPT

## 2025-03-18 RX ORDER — PROPOFOL 10 MG/ML
INJECTION, EMULSION INTRAVENOUS
Status: DISCONTINUED | OUTPATIENT
Start: 2025-03-18 | End: 2025-03-18 | Stop reason: SDUPTHER

## 2025-03-18 RX ORDER — SODIUM CHLORIDE, SODIUM LACTATE, POTASSIUM CHLORIDE, CALCIUM CHLORIDE 600; 310; 30; 20 MG/100ML; MG/100ML; MG/100ML; MG/100ML
INJECTION, SOLUTION INTRAVENOUS CONTINUOUS
Status: DISCONTINUED | OUTPATIENT
Start: 2025-03-18 | End: 2025-03-18 | Stop reason: HOSPADM

## 2025-03-18 RX ORDER — LIDOCAINE HYDROCHLORIDE 10 MG/ML
1 INJECTION, SOLUTION EPIDURAL; INFILTRATION; INTRACAUDAL; PERINEURAL
Status: DISCONTINUED | OUTPATIENT
Start: 2025-03-18 | End: 2025-03-18 | Stop reason: HOSPADM

## 2025-03-18 RX ORDER — LIDOCAINE HYDROCHLORIDE 20 MG/ML
INJECTION, SOLUTION EPIDURAL; INFILTRATION; INTRACAUDAL; PERINEURAL
Status: DISCONTINUED | OUTPATIENT
Start: 2025-03-18 | End: 2025-03-18 | Stop reason: SDUPTHER

## 2025-03-18 RX ADMIN — LIDOCAINE HYDROCHLORIDE 60 MG: 20 INJECTION, SOLUTION EPIDURAL; INFILTRATION; INTRACAUDAL; PERINEURAL at 12:49

## 2025-03-18 RX ADMIN — PROPOFOL 100 MG: 10 INJECTION, EMULSION INTRAVENOUS at 12:49

## 2025-03-18 RX ADMIN — PROPOFOL 100 MG: 10 INJECTION, EMULSION INTRAVENOUS at 12:51

## 2025-03-18 RX ADMIN — SODIUM CHLORIDE, SODIUM LACTATE, POTASSIUM CHLORIDE, AND CALCIUM CHLORIDE: 600; 310; 30; 20 INJECTION, SOLUTION INTRAVENOUS at 12:45

## 2025-03-18 ASSESSMENT — PAIN - FUNCTIONAL ASSESSMENT
PAIN_FUNCTIONAL_ASSESSMENT: NONE - DENIES PAIN

## 2025-03-18 NOTE — ANESTHESIA PRE PROCEDURE
01/27/2020 08:49 AM    AST 35 01/27/2020 08:49 AM    ALT 58 01/27/2020 08:49 AM       POC Tests: No results for input(s): \"POCGLU\", \"POCNA\", \"POCK\", \"POCCL\", \"POCBUN\", \"POCHEMO\", \"POCHCT\" in the last 72 hours.    Coags: No results found for: \"PROTIME\", \"INR\", \"APTT\"    HCG (If Applicable): No results found for: \"PREGTESTUR\", \"PREGSERUM\", \"HCG\", \"HCGQUANT\"     ABGs: No results found for: \"PHART\", \"PO2ART\", \"SKM5NOB\", \"TKE4LYT\", \"BEART\", \"Q7RZMORC\"     Type & Screen (If Applicable):  No results found for: \"ABORH\", \"LABANTI\"    Drug/Infectious Status (If Applicable):  No results found for: \"HIV\", \"HEPCAB\"    COVID-19 Screening (If Applicable): No results found for: \"COVID19\"        Anesthesia Evaluation  Patient summary reviewed  Airway: Mallampati: II  TM distance: >3 FB   Neck ROM: full  Mouth opening: > = 3 FB   Dental: normal exam         Pulmonary:Negative Pulmonary ROS and normal exam                               Cardiovascular:  Exercise tolerance: good (>4 METS)  (+) hypertension: no interval change                  Neuro/Psych:   Negative Neuro/Psych ROS              GI/Hepatic/Renal:   (+) liver disease: portal hypertension         ROS comment: Drinkks 5-6 beers daily.   Endo/Other: Negative Endo/Other ROS   (+) malignancy/cancer (h/o Prostate cancer).                 Abdominal:             Vascular: negative vascular ROS.         Other Findings:       Anesthesia Plan      MAC     ASA 3       Induction: intravenous.      Anesthetic plan and risks discussed with patient.      Plan discussed with CRNA.                PENELOPE ARANGO MD   3/18/2025

## 2025-03-18 NOTE — ANESTHESIA POSTPROCEDURE EVALUATION
Department of Anesthesiology  Postprocedure Note    Patient: Kendall Fung  MRN: [id number]  YOB: 1960  Date of evaluation: 3/18/2025    Procedure Summary       Date: 03/18/25 Room / Location: [facility] ENDO 02 / [facility] ENDOSCOPY    Anesthesia Start: 1245 Anesthesia Stop: 1301    Procedure: ESOPHAGOGASTRODUODENOSCOPY (Upper GI Region) Diagnosis:       Hepatic cirrhosis, unspecified hepatic cirrhosis type, unspecified whether ascites present (HCC)      Colon cancer screening      Obesity, unspecified class, unspecified obesity type, unspecified whether serious comorbidity present      (Hepatic cirrhosis, unspecified hepatic cirrhosis type, unspecified whether ascites present (HCC) [K74.60])      (Colon cancer screening [Z12.11])      (Obesity, unspecified class, unspecified obesity type, unspecified whether serious comorbidity present [E66.9])    Surgeons: Terrence Leone MD Responsible Provider: Teresa Camargo MD    Anesthesia Type: MAC ASA Status: 3            Anesthesia Type: No value filed.    Reginald Phase I: Reginald Score: 10    Reginald Phase II: Reginald Score: 10    Anesthesia Post Evaluation    Patient location during evaluation: bedside  Airway patency: patent  Cardiovascular status: hemodynamically stable  Respiratory status: acceptable  Hydration status: stable  Pain management: adequate    No notable events documented.

## 2025-03-18 NOTE — H&P
GASTROENTEROLOGY Pre-Procedure H and P      Impression/Plan:   1. This patient is consented for an EGD for  H/o cirrhosis, varices screening    Addendum: All lab tests orders pertaining to the procedure have been ordered by the anesthesia personnel and results will be addressed by the anesthesia team    Chief Complaint:  H/o cirrhosis, varices screening    HPI:  Kendall Fung is a 64 y.o. male who is having an EGD for  H/o cirrhosis, varices screening    PMH:   Past Medical History:   Diagnosis Date    Cirrhosis (HCC)     ED (erectile dysfunction)     Elevated liver enzymes     Elevated PSA     HTN (hypertension)     Prostate cancer (HCC)        PSH:   Past Surgical History:   Procedure Laterality Date    UROLOGICAL SURGERY  07/25/2024    PNBx [place] 6, PSA 4.9,Dr. Cid, [facility]       Social HX:   Social History     Socioeconomic History    Marital status: Single     Spouse name: Not on file    Number of children: Not on file    Years of education: Not on file    Highest education level: Not on file   Occupational History    Not on file   Tobacco Use    Smoking status: Never    Smokeless tobacco: Never   Vaping Use    Vaping status: Never Used   Substance and Sexual Activity    Alcohol use: Yes     Alcohol/week: 28.0 standard drinks of alcohol     Types: 28 Glasses of wine per week     Comment: 3 to 4 beers daily    Drug use: Not Currently     Types: Marijuana (Weed)     Comment: as a teen    Sexual activity: Not on file   Other Topics Concern    Not on file   Social History Narrative    Not on file     Social Drivers of Health     Financial Resource Strain: Not on file   Food Insecurity: Not on file   Transportation Needs: Not on file   Physical Activity: Not on file   Stress: Not on file   Social Connections: Not on file   Intimate Partner Violence: Not on file   Housing Stability: Not on file       FHX:   History reviewed. No pertinent family history.    Allergy:   No Known Allergies    Home Medications:

## 2025-04-07 ENCOUNTER — OFFICE VISIT (OUTPATIENT)
Age: 65
End: 2025-04-07

## 2025-04-07 VITALS — WEIGHT: 250 LBS | BODY MASS INDEX: 36.92 KG/M2

## 2025-04-07 DIAGNOSIS — M99.04 SACRAL REGION SOMATIC DYSFUNCTION: ICD-10-CM

## 2025-04-07 DIAGNOSIS — M99.02 THORACIC REGION SOMATIC DYSFUNCTION: ICD-10-CM

## 2025-04-07 DIAGNOSIS — M75.42 IMPINGEMENT SYNDROME OF BOTH SHOULDERS: Primary | ICD-10-CM

## 2025-04-07 DIAGNOSIS — M17.0 PRIMARY LOCALIZED OSTEOARTHRITIS OF KNEES, BILATERAL: ICD-10-CM

## 2025-04-07 DIAGNOSIS — M51.360 DEGENERATION OF INTERVERTEBRAL DISC OF LUMBAR REGION WITH DISCOGENIC BACK PAIN: ICD-10-CM

## 2025-04-07 DIAGNOSIS — M99.07 UPPER EXTREMITY SOMATIC DYSFUNCTION: ICD-10-CM

## 2025-04-07 DIAGNOSIS — M99.09 SOMATIC DYSFUNCTION OF ABDOMINAL REGION: ICD-10-CM

## 2025-04-07 DIAGNOSIS — M22.2X2 PATELLOFEMORAL DISORDER OF BOTH KNEES: ICD-10-CM

## 2025-04-07 DIAGNOSIS — M99.06 LOWER LIMB REGION SOMATIC DYSFUNCTION: ICD-10-CM

## 2025-04-07 DIAGNOSIS — M22.2X1 PATELLOFEMORAL DISORDER OF BOTH KNEES: ICD-10-CM

## 2025-04-07 DIAGNOSIS — M99.05 PELVIC REGION SOMATIC DYSFUNCTION: ICD-10-CM

## 2025-04-07 DIAGNOSIS — M99.08 RIB CAGE REGION SOMATIC DYSFUNCTION: ICD-10-CM

## 2025-04-07 DIAGNOSIS — M99.03 SOMATIC DYSFUNCTION OF LUMBAR REGION: ICD-10-CM

## 2025-04-07 DIAGNOSIS — M75.41 IMPINGEMENT SYNDROME OF BOTH SHOULDERS: Primary | ICD-10-CM

## 2025-04-07 DIAGNOSIS — M99.01 CERVICAL SOMATIC DYSFUNCTION: ICD-10-CM

## 2025-04-07 RX ORDER — DICLOFENAC SODIUM 75 MG/1
75 TABLET, DELAYED RELEASE ORAL 2 TIMES DAILY WITH MEALS
Qty: 180 TABLET | Refills: 0 | Status: SHIPPED | OUTPATIENT
Start: 2025-04-07

## 2025-04-07 NOTE — PATIENT INSTRUCTIONS
Wall Walk Side                              Wall Walk Front  Standing with the wall on your bad                    Stand facing the wall, place forearm on  side place forearm on the wall.                 the wall. Walk your arm up the wall as   Walk your arm up the wall as shown.                shown in picture.    Pull bad shoulder up behind back   with good arm.  Hold 5 seconds.  Repeat each 15 Times  Perform 2 Time(s) a Day  Warming up with heating pad or doing these in  hot shower makes it much easier.   Either scan this QR code on your smartphone:        Or search [employer] for my channel:    Dr. KOFI Patterson    Rotator cuff  Low back/Piriformis

## 2025-04-07 NOTE — PROGRESS NOTES
AVS reviewed: yes,   HEP: N/A  Resources Provided: no   Patient questions/concerns answered: yes,   Patient verbalized understanding of treatment plan: yes,     
EC tablet      3. Patellofemoral disorder of both knees  diclofenac (VOLTAREN) 75 MG EC tablet      4. Degeneration of intervertebral disc of lumbar region with discogenic back pain  diclofenac (VOLTAREN) 75 MG EC tablet      5. Somatic dysfunction of lumbar region  OSTEOPATHIC MANIP,9-10 BODY REGN      6. Pelvic region somatic dysfunction  OSTEOPATHIC MANIP,9-10 BODY REGN      7. Sacral region somatic dysfunction  OSTEOPATHIC MANIP,9-10 BODY REGN      8. Thoracic region somatic dysfunction  OSTEOPATHIC MANIP,9-10 BODY REGN      9. Rib cage region somatic dysfunction  OSTEOPATHIC MANIP,9-10 BODY REGN      10. Cervical somatic dysfunction  OSTEOPATHIC MANIP,9-10 BODY REGN      11. Lower limb region somatic dysfunction  OSTEOPATHIC MANIP,9-10 BODY REGN      12. Upper extremity somatic dysfunction  OSTEOPATHIC MANIP,9-10 BODY REGN      13. Somatic dysfunction of abdominal region  OSTEOPATHIC MANIP,9-10 BODY REGN        Patient (or guardian if minor) verbalizes understanding of evaluation and plan.    Verbal consent obtained.  Cervical, Thoracic, Rib, Lumbar, Pelvic, Sacral, Upper Ext, Lower Ext, and Abdominal SD treated with Myofascial and ME.  Correction of previous malalignments verified after Tx.    Pt tolerated well.  Notes improvement of Sx and pain is now rated 2/10.    HEP/stretches daily. Discussed stretching/strengthening/posture.    Will continue HEP and increase to Voltaren 75mg as above and plan follow-up PRN for Rx. Declined PT.

## 2025-07-07 ENCOUNTER — OFFICE VISIT (OUTPATIENT)
Age: 65
End: 2025-07-07
Payer: MEDICARE

## 2025-07-07 VITALS — WEIGHT: 232 LBS | HEIGHT: 69 IN | BODY MASS INDEX: 34.36 KG/M2

## 2025-07-07 DIAGNOSIS — M99.03 SOMATIC DYSFUNCTION OF LUMBAR REGION: ICD-10-CM

## 2025-07-07 DIAGNOSIS — M99.06 LOWER LIMB REGION SOMATIC DYSFUNCTION: ICD-10-CM

## 2025-07-07 DIAGNOSIS — M99.05 PELVIC REGION SOMATIC DYSFUNCTION: ICD-10-CM

## 2025-07-07 DIAGNOSIS — M51.360 DEGENERATION OF INTERVERTEBRAL DISC OF LUMBAR REGION WITH DISCOGENIC BACK PAIN: ICD-10-CM

## 2025-07-07 DIAGNOSIS — M99.07 UPPER EXTREMITY SOMATIC DYSFUNCTION: ICD-10-CM

## 2025-07-07 DIAGNOSIS — M17.0 PRIMARY LOCALIZED OSTEOARTHRITIS OF KNEES, BILATERAL: ICD-10-CM

## 2025-07-07 DIAGNOSIS — M22.2X2 PATELLOFEMORAL DISORDER OF BOTH KNEES: ICD-10-CM

## 2025-07-07 DIAGNOSIS — M75.42 IMPINGEMENT SYNDROME OF BOTH SHOULDERS: Primary | ICD-10-CM

## 2025-07-07 DIAGNOSIS — M22.2X1 PATELLOFEMORAL DISORDER OF BOTH KNEES: ICD-10-CM

## 2025-07-07 DIAGNOSIS — M99.01 CERVICAL SOMATIC DYSFUNCTION: ICD-10-CM

## 2025-07-07 DIAGNOSIS — M99.09 SOMATIC DYSFUNCTION OF ABDOMINAL REGION: ICD-10-CM

## 2025-07-07 DIAGNOSIS — M75.41 IMPINGEMENT SYNDROME OF BOTH SHOULDERS: Primary | ICD-10-CM

## 2025-07-07 DIAGNOSIS — M99.04 SACRAL REGION SOMATIC DYSFUNCTION: ICD-10-CM

## 2025-07-07 DIAGNOSIS — M99.08 RIB CAGE REGION SOMATIC DYSFUNCTION: ICD-10-CM

## 2025-07-07 DIAGNOSIS — M99.02 THORACIC REGION SOMATIC DYSFUNCTION: ICD-10-CM

## 2025-07-07 PROCEDURE — 3017F COLORECTAL CA SCREEN DOC REV: CPT | Performed by: FAMILY MEDICINE

## 2025-07-07 PROCEDURE — G8417 CALC BMI ABV UP PARAM F/U: HCPCS | Performed by: FAMILY MEDICINE

## 2025-07-07 PROCEDURE — 1036F TOBACCO NON-USER: CPT | Performed by: FAMILY MEDICINE

## 2025-07-07 PROCEDURE — G8427 DOCREV CUR MEDS BY ELIG CLIN: HCPCS | Performed by: FAMILY MEDICINE

## 2025-07-07 PROCEDURE — 98929 OSTEOPATH MANJ 9-10 REGIONS: CPT | Performed by: FAMILY MEDICINE

## 2025-07-07 PROCEDURE — 99214 OFFICE O/P EST MOD 30 MIN: CPT | Performed by: FAMILY MEDICINE

## 2025-07-07 RX ORDER — DICLOFENAC SODIUM 75 MG/1
75 TABLET, DELAYED RELEASE ORAL 2 TIMES DAILY WITH MEALS
Qty: 180 TABLET | Refills: 0 | Status: SHIPPED | OUTPATIENT
Start: 2025-07-07

## 2025-07-07 NOTE — PROGRESS NOTES
HISTORY OF PRESENT ILLNESS    Kendall Fung 1960 is a 64 y.o. year old male comes in today to be evaluated and treated for: pain shoulders, back, knees - chronic    Since last appt 4/7/2025 has noticed Sx doing well and some HEP 4/week. Pain level 6/10. Using Voltaren 50mg with benefit and needs Rx.    IMAGING: XR lumbar 12/16/2024  IMPRESSION:  No acute fracture or subluxation.  Mild to moderate multilevel degenerative changes, most notable at L3-L4 and  L4-L5. At least mild neuroforaminal narrowing at L3-L4 and L4-L5.     XR both knees 10/19/2023  IMPRESSION:  Severe bilateral knee joint osteoarthrosis most pronounced in the medial  compartments with varus deformity. Moderate bilateral joint effusions.     XR both shoulders 3/7/2023  Advanced degenerative changes in the shoulders bilaterally, left greater than right.            No results found.    Past Surgical History:   Procedure Laterality Date    UPPER GASTROINTESTINAL ENDOSCOPY N/A 03/18/2025    ESOPHAGOGASTRODUODENOSCOPY performed by Terrence Leone MD at [facility] ENDOSCOPY    UROLOGICAL SURGERY  07/25/2024    PNBx [place] 6, PSA 4.9,Dr. Cid, [facility]    UROLOGICAL SURGERY  06/26/2025    PNBx [place] 7, PSA 4.8, Dr. Bowser, [facility]     Social History     Socioeconomic History    Marital status: Single     Spouse name: None    Number of children: None    Years of education: None    Highest education level: None   Tobacco Use    Smoking status: Never    Smokeless tobacco: Never   Vaping Use    Vaping status: Never Used   Substance and Sexual Activity    Alcohol use: Yes     Alcohol/week: 28.0 standard drinks of alcohol     Types: 28 Glasses of wine per week     Comment: 3 to 4 beers daily    Drug use: Not Currently     Types: Marijuana (Weed)     Comment: as a teen     Current Outpatient Medications   Medication Sig Dispense Refill    omeprazole (PRILOSEC) 40 MG delayed release capsule Take 1 capsule by mouth every morning (before breakfast)      levoFLOXacin (LEVAQUIN)

## (undated) DEVICE — GAUZE,SPONGE,4"X4",16PLY,STRL,LF,10/TRAY: Brand: MEDLINE

## (undated) DEVICE — SYRINGE MED 50ML LUERSLIP TIP

## (undated) DEVICE — ENDOSCOPY PUMP TUBING/ CAP SET: Brand: ERBE

## (undated) DEVICE — CATHETER SUCT TR FL TIP 14FR W/ O CTRL

## (undated) DEVICE — YANKAUER,SMOOTH HANDLE,HIGH CAPACITY: Brand: MEDLINE INDUSTRIES, INC.

## (undated) DEVICE — BASIN EMSIS 16OZ GRAPHITE PLAS KID SHP MOLD GRAD FOR ORAL

## (undated) DEVICE — LINER SUCT CANSTR 3000CC PLAS SFT PRE ASSEMB W/OUT TBNG W/

## (undated) DEVICE — UNDERPAD INCONT W23XL36IN STD BLU POLYPR BK FLUF SFT

## (undated) DEVICE — STERILE POLYISOPRENE POWDER-FREE SURGICAL GLOVES: Brand: PROTEXIS

## (undated) DEVICE — SOLUTION IRRIG 1000ML H2O STRL BLT

## (undated) DEVICE — BITE BLOCK ENDOSCP UNIV AD 6 TO 9.4 MM

## (undated) DEVICE — FORCEPS BX L240CM JAW DIA2.4MM ORNG L CAP W/ NDL DISP RAD

## (undated) DEVICE — SYRINGE MED 25GA 3ML L5/8IN SUBQ PLAS W/ DETACH NDL SFTY

## (undated) DEVICE — MEDI-VAC NON-CONDUCTIVE SUCTION TUBING: Brand: CARDINAL HEALTH

## (undated) DEVICE — CANNULA NSL AD TBNG L14FT STD PVC O2 CRV CONN NONFLARED NSL